# Patient Record
Sex: MALE | Race: WHITE | NOT HISPANIC OR LATINO | Employment: PART TIME | URBAN - METROPOLITAN AREA
[De-identification: names, ages, dates, MRNs, and addresses within clinical notes are randomized per-mention and may not be internally consistent; named-entity substitution may affect disease eponyms.]

---

## 2023-07-09 ENCOUNTER — HOSPITAL ENCOUNTER (EMERGENCY)
Facility: HOSPITAL | Age: 38
Discharge: HOME | End: 2023-07-09
Attending: EMERGENCY MEDICINE
Payer: COMMERCIAL

## 2023-07-09 ENCOUNTER — APPOINTMENT (EMERGENCY)
Dept: RADIOLOGY | Facility: HOSPITAL | Age: 38
End: 2023-07-09
Payer: COMMERCIAL

## 2023-07-09 VITALS
OXYGEN SATURATION: 100 % | TEMPERATURE: 97.6 F | DIASTOLIC BLOOD PRESSURE: 76 MMHG | SYSTOLIC BLOOD PRESSURE: 151 MMHG | BODY MASS INDEX: 38.31 KG/M2 | HEART RATE: 62 BPM | HEIGHT: 72 IN | RESPIRATION RATE: 20 BRPM | WEIGHT: 282.85 LBS

## 2023-07-09 DIAGNOSIS — R09.89 CHOKING EPISODE: Primary | ICD-10-CM

## 2023-07-09 PROCEDURE — 71045 X-RAY EXAM CHEST 1 VIEW: CPT

## 2023-07-09 PROCEDURE — 99283 EMERGENCY DEPT VISIT LOW MDM: CPT

## 2023-07-09 NOTE — ED PROVIDER NOTES
Pt Name: Nithin Cottonport  MRN: [id number]  [address] 1985  Age/Sex: 41 y.o. male  Date of evaluation: 7/9/2023  PCP: Dannie Hurt MD    [address]    Chief Complaint   Patient presents with   • Medical Problem     Pt reports choking on his lasagna dinner around 6143, pt had heimlich performed on him and was able to dislodge food. Pt has no complaints at this time         HPI    41 y.o. male presenting with choking episode. Patient requires care, per caregivers, patient was eating a dinner of stuffed shells very rapidly when he had a choking episode. Caregiver applied the Heimlich maneuver with resolution of the episode. Following that, patient sat down and completed his dinner without incident, was also drinking water on the way in. He denies any symptoms at this time. Caregiver states that they brought him in to be checked out per their employer's protocol. HPI      Past Medical and Surgical History    History reviewed. No pertinent past medical history. History reviewed. No pertinent surgical history. History reviewed. No pertinent family history. Social History     Tobacco Use   • Smoking status: Never   • Smokeless tobacco: Never   Substance Use Topics   • Alcohol use: Never   • Drug use: Never           Allergies    No Known Allergies    Home Medications    Prior to Admission medications    Not on File           Review of Systems    Review of Systems   Constitutional: Negative for appetite change, chills and diaphoresis. HENT: Negative for drooling, facial swelling, trouble swallowing and voice change. Respiratory: Negative for apnea, shortness of breath and wheezing. Cardiovascular: Negative for chest pain and leg swelling. Gastrointestinal: Negative for abdominal distention, abdominal pain, diarrhea, nausea and vomiting. Genitourinary: Negative for dysuria and urgency. Musculoskeletal: Negative for arthralgias, back pain, gait problem and neck pain.    Skin: Negative for color change, rash and wound. Neurological: Negative for seizures, speech difficulty, weakness and headaches. Psychiatric/Behavioral: Negative for agitation, behavioral problems and dysphoric mood. The patient is not nervous/anxious. All other systems reviewed and negative. Physical Exam      ED Triage Vitals [07/09/23 1932]   Temperature Pulse Respirations Blood Pressure SpO2   97.6 °F (36.4 °C) 62 20 151/76 100 %      Temp Source Heart Rate Source Patient Position - Orthostatic VS BP Location FiO2 (%)   Oral Monitor -- -- --      Pain Score       --               Physical Exam  Vitals and nursing note reviewed. Constitutional:       General: He is not in acute distress. Appearance: He is well-developed. He is not ill-appearing, toxic-appearing or diaphoretic. HENT:      Head: Normocephalic and atraumatic. Right Ear: External ear normal.      Left Ear: External ear normal.      Nose: Nose normal. No congestion. Mouth/Throat:      Pharynx: Oropharynx is clear. Eyes:      Conjunctiva/sclera: Conjunctivae normal.      Pupils: Pupils are equal, round, and reactive to light. Neck:      Trachea: No tracheal deviation. Cardiovascular:      Rate and Rhythm: Normal rate and regular rhythm. Pulses: Normal pulses. Heart sounds: Normal heart sounds. No murmur heard. Pulmonary:      Effort: Pulmonary effort is normal. No respiratory distress. Breath sounds: Normal breath sounds. No stridor. No wheezing or rales. Abdominal:      General: There is no distension. Palpations: Abdomen is soft. Tenderness: There is no abdominal tenderness. There is no guarding or rebound. Musculoskeletal:         General: No deformity. Normal range of motion. Cervical back: Normal range of motion and neck supple. Skin:     General: Skin is warm and dry. Capillary Refill: Capillary refill takes less than 2 seconds. Findings: No rash.    Neurological: Mental Status: He is alert and oriented to person, place, and time. Psychiatric:         Behavior: Behavior normal.         Thought Content: Thought content normal.         Judgment: Judgment normal.              Diagnostic Results      Labs:    Results Reviewed     None          All labs reviewed and utilized in the medical decision making process    Radiology:    XR chest 1 view portable   ED Interpretation   No acute cardiopulmonary process or osseous abnormality. All radiology studies independently viewed by me and interpreted by the radiologist.    Procedure    Procedures        ED Course of Care and Re-Assessments      Patient felt stable for discharge back home. Medications - No data to display        FINAL IMPRESSION    Final diagnoses:   Choking episode         DISPOSITION/PLAN    Presentation as above with choking episode resolved prior to arrival.  Patient able to tolerate remainder of his meal afterwards, able to drink fluids on the way in as well as ER. Examination reassuring with no abnormalities, patient has no complaints at this time. Chest x-ray obtained to help screen for possible aspiration, returned reassuring without evidence of same. Discharged with strict return precautions, follow-up with primary care doctor as needed. Time reflects when diagnosis was documented in both MDM as applicable and the Disposition within this note     Time User Action Codes Description Comment    7/9/2023  8:07 PM Jennifer Farah Add [R09.89] Choking episode       ED Disposition     ED Disposition   Discharge    Condition   Stable    Date/Time   Sun Jul 9, 2023  8:07 PM    Comment   Nils Jacques discharge to home/self care.                Follow-up Information     Follow up With Specialties Details Why Contact Info Additional [facility] Emergency Department Emergency Medicine Go to  If symptoms worsen [address] [facility] [address] [facility] Emergency Department, [place], [place], [address], MD Family Medicine Call  As needed [place]. [address]  [address]  [phone]               PATIENT REFERRED TO:    [address] [place] [address] Emergency Department  [address] [facility] [address] [phone]  Go to   If symptoms worsen    MD Matt Beal. [address]  [place] [id number] [phone]    Call   As needed      DISCHARGE MEDICATIONS:    There are no discharge medications for this patient. No discharge procedures on file. Josseline Og MD    Portions of the record may have been created with voice recognition software. Occasional wrong word or "sound alike" substitutions may have occurred due to the inherent limitations of voice recognition software.   Please read the chart carefully and recognize, using context, where substitutions have occurred     Josseline Og MD  07/09/23 3441

## 2023-07-14 ENCOUNTER — APPOINTMENT (EMERGENCY)
Dept: CT IMAGING | Facility: HOSPITAL | Age: 38
End: 2023-07-14
Payer: COMMERCIAL

## 2023-07-14 ENCOUNTER — HOSPITAL ENCOUNTER (EMERGENCY)
Facility: HOSPITAL | Age: 38
Discharge: HOME | End: 2023-07-14
Attending: EMERGENCY MEDICINE
Payer: COMMERCIAL

## 2023-07-14 VITALS
DIASTOLIC BLOOD PRESSURE: 78 MMHG | OXYGEN SATURATION: 98 % | TEMPERATURE: 97.8 F | SYSTOLIC BLOOD PRESSURE: 122 MMHG | RESPIRATION RATE: 18 BRPM | HEART RATE: 72 BPM

## 2023-07-14 DIAGNOSIS — J02.9 SORE THROAT: Primary | ICD-10-CM

## 2023-07-14 LAB
ALBUMIN SERPL BCP-MCNC: 5 G/DL (ref 3.5–5)
ALP SERPL-CCNC: 57 U/L (ref 34–104)
ALT SERPL W P-5'-P-CCNC: 21 U/L (ref 7–52)
ANION GAP SERPL CALCULATED.3IONS-SCNC: 10 MMOL/L
AST SERPL W P-5'-P-CCNC: 11 U/L (ref 13–39)
BASOPHILS # BLD AUTO: 0.05 THOUSANDS/ÂΜL (ref 0–0.1)
BASOPHILS NFR BLD AUTO: 0 % (ref 0–1)
BILIRUB SERPL-MCNC: 1.11 MG/DL (ref 0.2–1)
BUN SERPL-MCNC: 15 MG/DL (ref 5–25)
CALCIUM SERPL-MCNC: 10.1 MG/DL (ref 8.4–10.2)
CHLORIDE SERPL-SCNC: 103 MMOL/L (ref 96–108)
CO2 SERPL-SCNC: 24 MMOL/L (ref 21–32)
CREAT SERPL-MCNC: 0.82 MG/DL (ref 0.6–1.3)
EOSINOPHIL # BLD AUTO: 0.03 THOUSAND/ÂΜL (ref 0–0.61)
EOSINOPHIL NFR BLD AUTO: 0 % (ref 0–6)
ERYTHROCYTE [DISTWIDTH] IN BLOOD BY AUTOMATED COUNT: 13.7 % (ref 11.6–15.1)
FLUAV RNA RESP QL NAA+PROBE: NEGATIVE
FLUBV RNA RESP QL NAA+PROBE: NEGATIVE
GFR SERPL CREATININE-BSD FRML MDRD: 112 ML/MIN/1.73SQ M
GLUCOSE SERPL-MCNC: 86 MG/DL (ref 65–140)
HCT VFR BLD AUTO: 44.7 % (ref 36.5–49.3)
HGB BLD-MCNC: 15.4 G/DL (ref 12–17)
IMM GRANULOCYTES # BLD AUTO: 0.03 THOUSAND/UL (ref 0–0.2)
IMM GRANULOCYTES NFR BLD AUTO: 0 % (ref 0–2)
LYMPHOCYTES # BLD AUTO: 2.16 THOUSANDS/ÂΜL (ref 0.6–4.47)
LYMPHOCYTES NFR BLD AUTO: 18 % (ref 14–44)
MCH RBC QN AUTO: 28.7 PG (ref 26.8–34.3)
MCHC RBC AUTO-ENTMCNC: 34.5 G/DL (ref 31.4–37.4)
MCV RBC AUTO: 83 FL (ref 82–98)
MONOCYTES # BLD AUTO: 0.56 THOUSAND/ÂΜL (ref 0.17–1.22)
MONOCYTES NFR BLD AUTO: 5 % (ref 4–12)
NEUTROPHILS # BLD AUTO: 9.22 THOUSANDS/ÂΜL (ref 1.85–7.62)
NEUTS SEG NFR BLD AUTO: 77 % (ref 43–75)
NRBC BLD AUTO-RTO: 0 /100 WBCS
PLATELET # BLD AUTO: 295 THOUSANDS/UL (ref 149–390)
PMV BLD AUTO: 9.4 FL (ref 8.9–12.7)
POTASSIUM SERPL-SCNC: 3.7 MMOL/L (ref 3.5–5.3)
PROT SERPL-MCNC: 8.1 G/DL (ref 6.4–8.4)
RBC # BLD AUTO: 5.37 MILLION/UL (ref 3.88–5.62)
RSV RNA RESP QL NAA+PROBE: NEGATIVE
S PYO DNA THROAT QL NAA+PROBE: NOT DETECTED
SARS-COV-2 RNA RESP QL NAA+PROBE: NEGATIVE
SODIUM SERPL-SCNC: 137 MMOL/L (ref 135–147)
WBC # BLD AUTO: 12.05 THOUSAND/UL (ref 4.31–10.16)

## 2023-07-14 PROCEDURE — 36415 COLL VENOUS BLD VENIPUNCTURE: CPT | Performed by: EMERGENCY MEDICINE

## 2023-07-14 PROCEDURE — 96361 HYDRATE IV INFUSION ADD-ON: CPT

## 2023-07-14 PROCEDURE — 0241U HB NFCT DS VIR RESP RNA 4 TRGT: CPT | Performed by: EMERGENCY MEDICINE

## 2023-07-14 PROCEDURE — 80053 COMPREHEN METABOLIC PANEL: CPT | Performed by: EMERGENCY MEDICINE

## 2023-07-14 PROCEDURE — 87651 STREP A DNA AMP PROBE: CPT | Performed by: EMERGENCY MEDICINE

## 2023-07-14 PROCEDURE — 85025 COMPLETE CBC W/AUTO DIFF WBC: CPT | Performed by: EMERGENCY MEDICINE

## 2023-07-14 PROCEDURE — G1004 CDSM NDSC: HCPCS

## 2023-07-14 PROCEDURE — 99284 EMERGENCY DEPT VISIT MOD MDM: CPT | Performed by: EMERGENCY MEDICINE

## 2023-07-14 PROCEDURE — 96374 THER/PROPH/DIAG INJ IV PUSH: CPT

## 2023-07-14 PROCEDURE — 70491 CT SOFT TISSUE NECK W/DYE: CPT

## 2023-07-14 PROCEDURE — 99283 EMERGENCY DEPT VISIT LOW MDM: CPT

## 2023-07-14 RX ORDER — KETOROLAC TROMETHAMINE 30 MG/ML
15 INJECTION, SOLUTION INTRAMUSCULAR; INTRAVENOUS ONCE
Status: COMPLETED | OUTPATIENT
Start: 2023-07-14 | End: 2023-07-14

## 2023-07-14 RX ADMIN — IOHEXOL 85 ML: 350 INJECTION, SOLUTION INTRAVENOUS at 21:24

## 2023-07-14 RX ADMIN — SODIUM CHLORIDE 1000 ML: 0.9 INJECTION, SOLUTION INTRAVENOUS at 20:25

## 2023-07-14 RX ADMIN — KETOROLAC TROMETHAMINE 15 MG: 30 INJECTION, SOLUTION INTRAMUSCULAR at 20:23

## 2023-07-14 RX ADMIN — DEXAMETHASONE SODIUM PHOSPHATE 10 MG: 10 INJECTION, SOLUTION INTRAMUSCULAR; INTRAVENOUS at 20:13

## 2023-07-15 NOTE — ED PROVIDER NOTES
History  Chief Complaint   Patient presents with   • Sore Throat     Caretaker states pt has been c/o sore throat, chills and body aches. 45-year-old male, history of autism, presents to the emergency room with his caregiver for sore throat, chills, subjective fever, and difficulty swallowing. States that he was seen here 6 days ago after he choked on a stuffed shell. States that he was discharged and saw his PCP Wednesday - had labs and ct scan done which were normal. States that today he developed chills, and subjective fever. States that he ate a few bites of mac and cheese but stopped bc his throat hurt. No other complaints. No sick contacts       History provided by:  Patient  Sore Throat  Location:  Generalized  Severity:  Moderate  Onset quality:  Sudden  Timing:  Constant  Progression:  Worsening  Chronicity:  New  Relieved by:  None tried  Worsened by:  Nothing  Ineffective treatments:  None tried  Associated symptoms: no abdominal pain, no chest pain, no chills, no cough, no ear pain, no fever, no headaches, no neck stiffness, no rash and no shortness of breath    Risk factors: no sick contacts        None       History reviewed. No pertinent past medical history. History reviewed. No pertinent surgical history. History reviewed. No pertinent family history. I have reviewed and agree with the history as documented. E-Cigarette/Vaping     E-Cigarette/Vaping Substances     Social History     Tobacco Use   • Smoking status: Never   • Smokeless tobacco: Never   Substance Use Topics   • Alcohol use: Never   • Drug use: Never       Review of Systems   Constitutional: Negative for chills and fever. HENT: Positive for sore throat. Negative for congestion and ear pain. Eyes: Negative for pain and visual disturbance. Respiratory: Negative for cough, shortness of breath and wheezing. Cardiovascular: Negative for chest pain and leg swelling.    Gastrointestinal: Negative for abdominal pain, diarrhea, nausea and vomiting. Genitourinary: Negative for dysuria, frequency, hematuria and urgency. Musculoskeletal: Negative for neck pain and neck stiffness. Skin: Negative for rash and wound. Neurological: Negative for weakness, numbness and headaches. Psychiatric/Behavioral: Negative for agitation and confusion. All other systems reviewed and are negative. Physical Exam  Physical Exam  Vitals and nursing note reviewed. Constitutional:       Appearance: He is well-developed. HENT:      Head: Normocephalic and atraumatic. Eyes:      Pupils: Pupils are equal, round, and reactive to light. Cardiovascular:      Rate and Rhythm: Normal rate and regular rhythm. Pulmonary:      Effort: Pulmonary effort is normal.      Breath sounds: Normal breath sounds. Abdominal:      General: Bowel sounds are normal.      Palpations: Abdomen is soft. Musculoskeletal:         General: Normal range of motion. Cervical back: Normal range of motion and neck supple. Skin:     General: Skin is warm and dry. Neurological:      General: No focal deficit present. Mental Status: He is alert and oriented to person, place, and time.       Comments: No focal deficits         Vital Signs  ED Triage Vitals   Temperature Pulse Respirations Blood Pressure SpO2   07/14/23 2013 07/14/23 2011 07/14/23 2011 07/14/23 2011 07/14/23 2011   97.8 °F (36.6 °C) 73 18 154/84 100 %      Temp Source Heart Rate Source Patient Position - Orthostatic VS BP Location FiO2 (%)   07/14/23 2013 07/14/23 2200 -- -- --   Oral Monitor         Pain Score       --                  Vitals:    07/14/23 2030 07/14/23 2130 07/14/23 2200 07/14/23 2300   BP: 146/87 145/76 134/61 122/78   Pulse: 69 87 73 72         Visual Acuity      ED Medications  Medications   sodium chloride 0.9 % bolus 1,000 mL (0 mL Intravenous Stopped 7/14/23 2300)   dexamethasone oral liquid 10 mg 1 mL (10 mg Oral Given 7/14/23 2013)   ketorolac (TORADOL) injection 15 mg (15 mg Intravenous Given 7/14/23 2023)   iohexol (OMNIPAQUE) 350 MG/ML injection (SINGLE-DOSE) 85 mL (85 mL Intravenous Given 7/14/23 2124)       Diagnostic Studies  Results Reviewed     Procedure Component Value Units Date/Time    FLU/RSV/COVID - if FLU/RSV clinically relevant [[id number]]  (Normal) Collected: 07/14/23 2014    Lab Status: Final result Specimen: Nares from Nose Updated: 07/14/23 2130     SARS-CoV-2 Negative     INFLUENZA A PCR Negative     INFLUENZA B PCR Negative     RSV PCR Negative    Narrative:      FOR PEDIATRIC PATIENTS - copy/paste COVID Guidelines URL to browser: [email or web address]    SARS-CoV-2 assay is a Nucleic Acid Amplification assay intended for the  qualitative detection of nucleic acid from SARS-CoV-2 in nasopharyngeal  swabs. Results are for the presumptive identification of SARS-CoV-2 RNA. Positive results are indicative of infection with SARS-CoV-2, the virus  causing COVID-19, but do not rule out bacterial infection or co-infection  with other viruses. Laboratories within the [facility] and its  territories are required to report all positive results to the appropriate  public health authorities. Negative results do not preclude SARS-CoV-2  infection and should not be used as the sole basis for treatment or other  patient management decisions. Negative results must be combined with  clinical observations, patient history, and epidemiological information. This test has not been FDA cleared or approved. This test has been authorized by FDA under an Emergency Use Authorization  (EUA). This test is only authorized for the duration of time the  declaration that circumstances exist justifying the authorization of the  emergency use of an in vitro diagnostic tests for detection of SARS-CoV-2  virus and/or diagnosis of COVID-19 infection under section 564(b)(1) of  the Act, 21 U. S.C. 771HCZ-9(P)(2), unless the authorization is terminated  or revoked sooner. The test has been validated but independent review by FDA  and CLIA is pending. Test performed using [employer] Gene[employer]pert: This RT-PCR assay targets N2,  a region unique to SARS-CoV-2. A conserved region in the E-gene was chosen  for pan-Sarbecovirus detection which includes SARS-CoV-2. According to CMS-2020-01-R, this platform meets the definition of high-throughput technology.     Strep A PCR [[id number]]  (Normal) Collected: 07/14/23 2016    Lab Status: Final result Specimen: Throat Updated: 07/14/23 2118     STREP A PCR Not Detected    Comprehensive metabolic panel [[id number]]  (Abnormal) Collected: 07/14/23 2023    Lab Status: Final result Specimen: Blood from Arm, Right Updated: 07/14/23 2111     Sodium 137 mmol/L      Potassium 3.7 mmol/L      Chloride 103 mmol/L      CO2 24 mmol/L      ANION GAP 10 mmol/L      BUN 15 mg/dL      Creatinine 0.82 mg/dL      Glucose 86 mg/dL      Calcium 10.1 mg/dL      AST 11 U/L      ALT 21 U/L      Alkaline Phosphatase 57 U/L      Total Protein 8.1 g/dL      Albumin 5.0 g/dL      Total Bilirubin 1.11 mg/dL      eGFR 112 ml/min/1.73sq m     Narrative:      [place] guidelines for Chronic Kidney Disease (CKD):   •  Stage 1 with normal or high GFR (GFR > 90 mL/min/1.73 square meters)  •  Stage 2 Mild CKD (GFR = 60-89 mL/min/1.73 square meters)  •  Stage 3A Moderate CKD (GFR = 45-59 mL/min/1.73 square meters)  •  Stage 3B Moderate CKD (GFR = 30-44 mL/min/1.73 square meters)  •  Stage 4 Severe CKD (GFR = 15-29 mL/min/1.73 square meters)  •  Stage 5 End Stage CKD (GFR <15 mL/min/1.73 square meters)  Note: GFR calculation is accurate only with a steady state creatinine    CBC and differential [[id number]]  (Abnormal) Collected: 07/14/23 2023    Lab Status: Final result Specimen: Blood from Arm, Right Updated: 07/14/23 2051     WBC 12.05 Thousand/uL      RBC 5.37 Million/uL      Hemoglobin 15.4 g/dL Hematocrit 44.7 %      MCV 83 fL      MCH 28.7 pg      MCHC 34.5 g/dL      RDW 13.7 %      MPV 9.4 fL      Platelets 457 Thousands/uL      nRBC 0 /100 WBCs      Neutrophils Relative 77 %      Immat GRANS % 0 %      Lymphocytes Relative 18 %      Monocytes Relative 5 %      Eosinophils Relative 0 %      Basophils Relative 0 %      Neutrophils Absolute 9.22 Thousands/µL      Immature Grans Absolute 0.03 Thousand/uL      Lymphocytes Absolute 2.16 Thousands/µL      Monocytes Absolute 0.56 Thousand/µL      Eosinophils Absolute 0.03 Thousand/µL      Basophils Absolute 0.05 Thousands/µL     UA w Reflex to Microscopic w Reflex to Culture [[id number]]     Lab Status: No result Specimen: Urine                  CT soft tissue neck with contrast   Final Result by Ramesh Brown MD (07/14 2222)      No acute CT findings. Incidental thyroid nodule(s) for which nonemergent thyroid ultrasound is recommended. Workstation performed: [id number]                    Procedures  Procedures         ED Course  ED Course as of 07/15/23 0054   Fri Jul 14, 2023 1958 Choked on Sunday - stuffed shell - seen here. Discharged and saw his pcp Wednesday. Had labs and ct scan done which were normal. Having chills, felt warm, feels like throat is closing. Lives in a home with a caregiver. No one else is sick. Medical Decision Making  42 y/o male presents with sore throat, chills, and subjective fever- will get labs, CT scan, strep, and covid/flu- will reassess. Sore throat: acute illness or injury  Amount and/or Complexity of Data Reviewed  Labs: ordered. Radiology: ordered. Risk  Prescription drug management.           Disposition  Final diagnoses:   Sore throat     Time reflects when diagnosis was documented in both MDM as applicable and the Disposition within this note     Time User Action Codes Description Comment    7/14/2023 11:00 PM Peggy ORTIZ Add [J02.9] Sore throat       ED Disposition     ED Disposition   Discharge    Condition   Stable    Date/Time   Fri Jul 14, 2023 11:00 PM    Comment   Casey Angeles discharge to home/self care. Follow-up Information     Follow up With Specialties Details Why Contact Info Additional [address] [facility] G[facility]ology Call in 1 day for follow up within 1 week [address] [facility]  [address] [facility], [address] [facility], [address] [facility], [place], [place], [address] [facility] Emergency Department Emergency Medicine Go to  immediately for any new or worsening symptoms [address] [facility] [address] [facility] [address]  [address] [place] [address] Emergency Department, [place], [place], [address]          There are no discharge medications for this patient. No discharge procedures on file.     PDMP Review     None          ED Provider  Electronically Signed by           Chuy Morgan DO  07/15/23 8750

## 2023-07-28 ENCOUNTER — OFFICE VISIT (OUTPATIENT)
Age: 38
End: 2023-07-28
Payer: COMMERCIAL

## 2023-07-28 VITALS
BODY MASS INDEX: 36.57 KG/M2 | DIASTOLIC BLOOD PRESSURE: 70 MMHG | OXYGEN SATURATION: 97 % | HEIGHT: 72 IN | HEART RATE: 76 BPM | WEIGHT: 270 LBS | SYSTOLIC BLOOD PRESSURE: 122 MMHG

## 2023-07-28 DIAGNOSIS — R13.10 DYSPHAGIA, UNSPECIFIED TYPE: ICD-10-CM

## 2023-07-28 DIAGNOSIS — K21.9 GASTROESOPHAGEAL REFLUX DISEASE, UNSPECIFIED WHETHER ESOPHAGITIS PRESENT: Primary | ICD-10-CM

## 2023-07-28 DIAGNOSIS — R63.4 WEIGHT LOSS: ICD-10-CM

## 2023-07-28 PROCEDURE — 99204 OFFICE O/P NEW MOD 45 MIN: CPT | Performed by: PHYSICIAN ASSISTANT

## 2023-07-28 RX ORDER — ATORVASTATIN CALCIUM 40 MG/1
40 TABLET, FILM COATED ORAL EVERY EVENING
COMMUNITY
Start: 2023-07-26

## 2023-07-28 RX ORDER — LISINOPRIL 5 MG/1
5 TABLET ORAL DAILY
COMMUNITY
Start: 2023-07-05

## 2023-07-28 RX ORDER — MELATONIN
1000 DAILY
COMMUNITY

## 2023-07-28 RX ORDER — PANTOPRAZOLE SODIUM 20 MG/1
20 TABLET, DELAYED RELEASE ORAL
COMMUNITY
Start: 2023-07-12

## 2023-07-28 RX ORDER — PANTOPRAZOLE SODIUM 40 MG/1
40 TABLET, DELAYED RELEASE ORAL DAILY
Qty: 30 TABLET | Refills: 1 | Status: SHIPPED | OUTPATIENT
Start: 2023-07-28 | End: 2023-08-27

## 2023-07-28 NOTE — PROGRESS NOTES
[facility] - Outpatient Consultation  Nikos Nguyen 41 y.o. male MRN: [id number]  Encounter: [id number]          ASSESSMENT AND PLAN:      1. Dysphagia  2. Gastroesophageal reflux disease  3. Weight loss    Patient presents with complaints of dysphagia, heartburn, and excessive belching. He also has had a substantial weight loss. He was started on Pantoprazole 20mg po daily with minimal benefit. Will plan for EGD to investigate for strictures/rings, esophagitis, ulcers, malignancy, etc.  Will increase Pantoprazole to 40mg po daily. ______________________________________________________________________    HPI:  Patient is a pleasant 41year old male who presents with his caretaker for an evaluation of dysphagia. Patient has been having episodes of dysphagia. He reports episodes of dysphagia with solids and liquids. He also has been having heartburn and excessive belching. His caretaker also reports he had weight loss as well. No prior EGD. No melena or rectal bleeding. He was started on Pantoprazole 20mg po daily with minimal benefit. REVIEW OF SYSTEMS:    CONSTITUTIONAL: Denies any fever, chills, rigors; +weight loss. HEENT: No earache or tinnitus. Denies hearing loss or visual disturbances. CARDIOVASCULAR: No chest pain or palpitations. RESPIRATORY: Denies any cough, hemoptysis, shortness of breath or dyspnea on exertion. GASTROINTESTINAL: As noted in the History of Present Illness. GENITOURINARY: No problems with urination. Denies any hematuria or dysuria. NEUROLOGIC: No dizziness or vertigo, denies headaches. MUSCULOSKELETAL: Denies any muscle or joint pain. SKIN: Denies skin rashes or itching. ENDOCRINE: Denies excessive thirst. Denies intolerance to heat or cold. PSYCHOSOCIAL: Denies depression or anxiety. Denies any recent memory loss.        Historical Information   Past Medical History:   Diagnosis Date   • GERD (gastroesophageal reflux disease)      History reviewed. No pertinent surgical history. Social History   Social History     Substance and Sexual Activity   Alcohol Use Never     Social History     Substance and Sexual Activity   Drug Use Never     Social History     Tobacco Use   Smoking Status Never   Smokeless Tobacco Never     History reviewed. No pertinent family history. Meds/Allergies       Current Outpatient Medications:   •  atorvastatin (LIPITOR) 40 mg tablet  •  cholecalciferol (VITAMIN D3) 1,000 units tablet  •  lisinopril (ZESTRIL) 5 mg tablet  •  pantoprazole (PROTONIX) 20 mg tablet  •  pantoprazole (PROTONIX) 40 mg tablet    No Known Allergies        Objective     Blood pressure 122/70, pulse 76, height 6' (1.829 m), weight 122 kg (270 lb), SpO2 97 %. Body mass index is 36.62 kg/m². PHYSICAL EXAM:      General Appearance:   Alert, cooperative, no distress   HEENT:   Normocephalic, atraumatic, anicteric    Neck:  Supple, symmetrical, trachea midline   Lungs:   Clear to auscultation bilaterally; no rales, rhonchi or wheezing; respirations unlabored    Heart[de-identified]   Regular rate and rhythm; no murmur, rub, or gallop. Abdomen:   Soft, non-tender, non-distended; normal bowel sounds; no masses, no organomegaly    Genitalia:   Deferred    Rectal:   Deferred    Extremities:  No cyanosis, clubbing   Pulses:  2+ and symmetric    Skin:  No jaundice, rashes, or lesions    Lymph nodes:  No palpable cervical lymphadenopathy        Lab Results:   No visits with results within 1 Day(s) from this visit.    Latest known visit with results is:   Admission on 07/14/2023, Discharged on 07/14/2023   Component Date Value   • WBC 07/14/2023 12.05 (H)    • RBC 07/14/2023 5.37    • Hemoglobin 07/14/2023 15.4    • Hematocrit 07/14/2023 44.7    • MCV 07/14/2023 83    • MCH 07/14/2023 28.7    • MCHC 07/14/2023 34.5    • RDW 07/14/2023 13.7    • MPV 07/14/2023 9.4    • Platelets 12/40/5614 295    • nRBC 07/14/2023 0    • Neutrophils Relative 07/14/2023 77 (H)    • Immat GRANS % 07/14/2023 0    • Lymphocytes Relative 07/14/2023 18    • Monocytes Relative 07/14/2023 5    • Eosinophils Relative 07/14/2023 0    • Basophils Relative 07/14/2023 0    • Neutrophils Absolute 07/14/2023 9.22 (H)    • Immature Grans Absolute 07/14/2023 0.03    • Lymphocytes Absolute 07/14/2023 2.16    • Monocytes Absolute 07/14/2023 0.56    • Eosinophils Absolute 07/14/2023 0.03    • Basophils Absolute 07/14/2023 0.05    • Sodium 07/14/2023 137    • Potassium 07/14/2023 3.7    • Chloride 07/14/2023 103    • CO2 07/14/2023 24    • ANION GAP 07/14/2023 10    • BUN 07/14/2023 15    • Creatinine 07/14/2023 0.82    • Glucose 07/14/2023 86    • Calcium 07/14/2023 10.1    • AST 07/14/2023 11 (L)    • ALT 07/14/2023 21    • Alkaline Phosphatase 07/14/2023 57    • Total Protein 07/14/2023 8.1    • Albumin 07/14/2023 5.0    • Total Bilirubin 07/14/2023 1.11 (H)    • eGFR 07/14/2023 112    • SARS-CoV-2 07/14/2023 Negative    • INFLUENZA A PCR 07/14/2023 Negative    • INFLUENZA B PCR 07/14/2023 Negative    • RSV PCR 07/14/2023 Negative    • STREP A PCR 07/14/2023 Not Detected          Radiology Results:   CT soft tissue neck with contrast    Result Date: 7/14/2023  Narrative: CT NECK WITH CONTRAST INDICATION:   difficulty swallowing. COMPARISON:  None. TECHNIQUE:  Axial, sagittal, and coronal 2D reformatted images were created from the axial source data and submitted for interpretation. Radiation dose length product (DLP) for this visit:  768 mGy-cm . This examination, like all CT scans performed in the [facility], was performed utilizing techniques to minimize radiation dose exposure, including the use of iterative reconstruction and automated exposure control. IV Contrast:  85 mL of iohexol (OMNIPAQUE) IMAGE QUALITY:  Diagnostic. FINDINGS: VISUALIZED BRAIN PARENCHYMA:  No acute intracranial pathology of the visualized brain parenchyma.  VISUALIZED ORBITS: Normal visualized orbits. PARANASAL SINUSES: Normal visualized paranasal sinuses. NASAL CAVITY AND NASOPHARYNX:  Normal. SUPRAHYOID NECK:  Normal oral cavity, tongue base, tonsillar fossa and epiglottis. INFRAHYOID NECK:  Aryepiglottic folds and piriform sinuses are normal.  Normal glottis and subglottic airway. THYROID GLAND: 1.7 cm right thyroid nodule  Incidental discovery of one or more thyroid nodule(s) measuring more than 1.5 cm and without suspicious features is noted in this patient who is above 28years old; according to guidelines published in the February 2015 white paper on incidental thyroid nodules in the Journal of the [employer] of Radiology Gerhardt Bean), further characterization with thyroid ultrasound is recommended. PAROTID AND SUBMANDIBULAR GLANDS:  Normal. LYMPH NODES:  No pathologic or enlarged adenopathy. VASCULAR STRUCTURES:  Normal enhancement of the cervical vasculature. THORACIC INLET:  Lung apices and upper mediastinum are unremarkable. BONY STRUCTURES: No acute fracture or destructive osseous lesion. Impression: No acute CT findings. Incidental thyroid nodule(s) for which nonemergent thyroid ultrasound is recommended. Workstation performed: [id number]     XR chest 1 view portable    Result Date: 7/10/2023  Narrative: CHEST INDICATION:   possible aspiration. COMPARISON:  None EXAM PERFORMED/VIEWS:  XR CHEST PORTABLE FINDINGS: Cardiomediastinal silhouette appears unremarkable. There is mild bibasilar atelectasis with no focal infiltrate or consolidation. No pneumothorax or pleural effusion. Osseous structures appear within normal limits for patient age. Impression: No acute cardiopulmonary disease.  Workstation performed: [id number]

## 2023-07-28 NOTE — PATIENT INSTRUCTIONS
Scheduled date of EGD(as of today): 9/1/23  Physician performing EGD: Ras  Location of EGD: [facility]  Instructions reviewed with patient by: Sophia SAMANIEGO  Clearances:

## 2023-08-26 DIAGNOSIS — K21.9 GASTROESOPHAGEAL REFLUX DISEASE, UNSPECIFIED WHETHER ESOPHAGITIS PRESENT: ICD-10-CM

## 2023-08-28 RX ORDER — PANTOPRAZOLE SODIUM 40 MG/1
40 TABLET, DELAYED RELEASE ORAL DAILY
Qty: 90 TABLET | Refills: 1 | Status: SHIPPED | OUTPATIENT
Start: 2023-08-28

## 2023-08-31 ENCOUNTER — TELEPHONE (OUTPATIENT)
Age: 38
End: 2023-08-31

## 2023-09-22 ENCOUNTER — ANESTHESIA (OUTPATIENT)
Dept: GASTROENTEROLOGY | Facility: HOSPITAL | Age: 38
End: 2023-09-22

## 2023-09-22 ENCOUNTER — HOSPITAL ENCOUNTER (OUTPATIENT)
Dept: GASTROENTEROLOGY | Facility: HOSPITAL | Age: 38
Setting detail: OUTPATIENT SURGERY
End: 2023-09-22
Payer: COMMERCIAL

## 2023-09-22 ENCOUNTER — ANESTHESIA EVENT (OUTPATIENT)
Dept: GASTROENTEROLOGY | Facility: HOSPITAL | Age: 38
End: 2023-09-22

## 2023-09-22 VITALS
OXYGEN SATURATION: 98 % | HEART RATE: 72 BPM | TEMPERATURE: 97.5 F | SYSTOLIC BLOOD PRESSURE: 127 MMHG | WEIGHT: 264.11 LBS | HEIGHT: 72 IN | RESPIRATION RATE: 16 BRPM | BODY MASS INDEX: 35.77 KG/M2 | DIASTOLIC BLOOD PRESSURE: 85 MMHG

## 2023-09-22 DIAGNOSIS — R13.10 DYSPHAGIA, UNSPECIFIED TYPE: ICD-10-CM

## 2023-09-22 DIAGNOSIS — R63.4 WEIGHT LOSS: ICD-10-CM

## 2023-09-22 DIAGNOSIS — K21.9 GASTROESOPHAGEAL REFLUX DISEASE, UNSPECIFIED WHETHER ESOPHAGITIS PRESENT: ICD-10-CM

## 2023-09-22 PROCEDURE — 88305 TISSUE EXAM BY PATHOLOGIST: CPT | Performed by: PATHOLOGY

## 2023-09-22 PROCEDURE — C1769 GUIDE WIRE: HCPCS

## 2023-09-22 RX ORDER — SODIUM CHLORIDE, SODIUM LACTATE, POTASSIUM CHLORIDE, CALCIUM CHLORIDE 600; 310; 30; 20 MG/100ML; MG/100ML; MG/100ML; MG/100ML
INJECTION, SOLUTION INTRAVENOUS CONTINUOUS PRN
Status: DISCONTINUED | OUTPATIENT
Start: 2023-09-22 | End: 2023-09-22

## 2023-09-22 RX ORDER — PROPOFOL 10 MG/ML
INJECTION, EMULSION INTRAVENOUS AS NEEDED
Status: DISCONTINUED | OUTPATIENT
Start: 2023-09-22 | End: 2023-09-22

## 2023-09-22 RX ORDER — ONDANSETRON 2 MG/ML
4 INJECTION INTRAMUSCULAR; INTRAVENOUS ONCE AS NEEDED
Status: CANCELLED | OUTPATIENT
Start: 2023-09-22

## 2023-09-22 RX ADMIN — SODIUM CHLORIDE, SODIUM LACTATE, POTASSIUM CHLORIDE, AND CALCIUM CHLORIDE: .6; .31; .03; .02 INJECTION, SOLUTION INTRAVENOUS at 09:35

## 2023-09-22 RX ADMIN — PROPOFOL 50 MG: 10 INJECTION, EMULSION INTRAVENOUS at 09:40

## 2023-09-22 RX ADMIN — PROPOFOL 100 MG: 10 INJECTION, EMULSION INTRAVENOUS at 09:38

## 2023-09-22 RX ADMIN — PROPOFOL 50 MG: 10 INJECTION, EMULSION INTRAVENOUS at 09:43

## 2023-09-22 NOTE — H&P
.  History and Physical - [facility]  Boni Celeste 41 y.o. male MRN: [id number]                  HPI: Boni Celeste is a 41y.o. year old male who presents for EGD for dysphagia. REVIEW OF SYSTEMS: Per the HPI, and otherwise unremarkable. Historical Information   Past Medical History:   Diagnosis Date   • GERD (gastroesophageal reflux disease)      History reviewed. No pertinent surgical history. Social History   Social History     Substance and Sexual Activity   Alcohol Use Never     Social History     Substance and Sexual Activity   Drug Use Never     Social History     Tobacco Use   Smoking Status Never   Smokeless Tobacco Never     History reviewed. No pertinent family history. Meds/Allergies       Current Outpatient Medications:   •  atorvastatin (LIPITOR) 40 mg tablet  •  cholecalciferol (VITAMIN D3) 1,000 units tablet  •  lisinopril (ZESTRIL) 5 mg tablet  •  pantoprazole (PROTONIX) 20 mg tablet  •  pantoprazole (PROTONIX) 40 mg tablet    No Known Allergies    Objective     /67   Pulse 63   Temp 97.6 °F (36.4 °C) (Temporal)   Resp 18   Ht 6' (1.829 m)   Wt 120 kg (264 lb 1.8 oz)   SpO2 100%   BMI 35.82 kg/m²       PHYSICAL EXAM    Gen: NAD  Head: NCAT  CV: RRR  CHEST: Clear  ABD: soft, NT/ND  EXT: no edema      ASSESSMENT/PLAN:  Boni Celeste is a 41y.o. year old male who presents for EGD for dysphagia. The patient is stable and optimized for the procedure, we reviewed risk and benefits. Risk include but not limited to infection, bleeding, perforation and missing a lesion.

## 2023-09-22 NOTE — ANESTHESIA PREPROCEDURE EVALUATION
Procedure:  EGD    Relevant Problems   No relevant active problems      GERD  BMI35    Physical Exam    Airway    Mallampati score: III  TM Distance: <3 FB  Neck ROM: full     Dental   No notable dental hx     Cardiovascular      Pulmonary      Other Findings        Anesthesia Plan  ASA Score- 2     Anesthesia Type- IV sedation with anesthesia with ASA Monitors. Additional Monitors:   Airway Plan:           Plan Factors-Exercise tolerance (METS): >4 METS. Chart reviewed. Patient summary reviewed. Induction- intravenous. Postoperative Plan-     Informed Consent- Anesthetic plan and risks discussed with patient. I personally reviewed this patient with the CRNA. Discussed and agreed on the Anesthesia Plan with the CRNA. Beck Diego

## 2023-09-22 NOTE — ANESTHESIA POSTPROCEDURE EVALUATION
Post-Op Assessment Note    CV Status:  Stable    Pain management: adequate     Mental Status:  Alert and awake   Hydration Status:  Euvolemic   PONV Controlled:  Controlled   Airway Patency:  Patent      Post Op Vitals Reviewed: Yes      Staff: CRNA         No notable events documented.     BP   122/72   Temp   36   Pulse  74   Resp   14   SpO2   100

## 2023-09-28 PROCEDURE — 88305 TISSUE EXAM BY PATHOLOGIST: CPT | Performed by: PATHOLOGY

## 2023-09-29 ENCOUNTER — TELEPHONE (OUTPATIENT)
Age: 38
End: 2023-09-29

## 2023-09-29 NOTE — TELEPHONE ENCOUNTER
----- Message from Nav Ruiz MD sent at 9/29/2023  6:36 AM EDT -----  Hi can we please call patient and let her know biopsy results are unremarkable. Thank you.

## 2024-02-16 ENCOUNTER — TELEPHONE (OUTPATIENT)
Dept: SLEEP CENTER | Facility: CLINIC | Age: 39
End: 2024-02-16

## 2024-02-16 NOTE — TELEPHONE ENCOUNTER
----- Message from Donald Del Castillo MD sent at 2/16/2024  1:22 PM EST -----  approved  ----- Message -----  From: Brandi Gardiner  Sent: 2/14/2024   2:33 PM EST  To: Donald Del Castillo MD    Paper H&P  This Diagnostic sleep study needs approval.     If approved please sign and return to clerical pool.    If denied please include reasons why.  Also provide alternative testing if warranted. Please sign and return to clerical pool.

## 2024-07-02 ENCOUNTER — HOSPITAL ENCOUNTER (OUTPATIENT)
Dept: SLEEP CENTER | Facility: CLINIC | Age: 39
Discharge: HOME | End: 2024-07-02
Payer: COMMERCIAL

## 2024-07-02 DIAGNOSIS — R06.83 SNORING: ICD-10-CM

## 2024-07-02 PROCEDURE — 95810 POLYSOM 6/> YRS 4/> PARAM: CPT

## 2024-07-02 PROCEDURE — 95810 POLYSOM 6/> YRS 4/> PARAM: CPT | Performed by: INTERNAL MEDICINE

## 2024-07-03 PROBLEM — G47.33 OSA (OBSTRUCTIVE SLEEP APNEA): Status: ACTIVE | Noted: 2024-07-03

## 2024-07-03 NOTE — PROGRESS NOTES
Sleep Study Documentation    Pre-Sleep Study       Sleep testing procedure explained to patient:YES    Patient napped prior to study:NO    Caffeine:Dayshift worker after 12PM.  Caffeine use:YES- soda  12 ounces    Alcohol:Dayshift workers after 5PM: Alcohol use:NO    Typical day for patient:YES       Study Documentation    Sleep Study Indications: Snore    Sleep Study: Diagnostic   Snore:Severe  Supplemental O2: no    O2 flow rate (L/min) range   O2 flow rate (L/min) final   Minimum SaO2 85%  Baseline SaO2 96%    EKG abnormalities: no     EEG abnormalities: no    Were abnormal behaviors in sleep observed:NO    Is Total Sleep Study Recording Time < 2 hours: N/A    Is Total Sleep Study Recording Time > 2 hours but study is incomplete: N/A    Is Total Sleep Study Recording Time 6 hours or more but sleep was not obtained: NO    Patient classification:        Post-Sleep Study    Medication used at bedtime or during sleep study:YES other prescription medications    Patient reports time it took to fall asleep:greater than 60 minutes    Patient reports waking up during study:3 or more times.  Patient reports returning to sleep without difficulty.    Patient reports sleeping less than 2 hours without dreaming.    Does the Patient feel this is a typical night of sleep:worse than usual    Patient rated sleepiness: Very sleepy or tired    PAP treatment:no.

## 2024-07-24 ENCOUNTER — TELEPHONE (OUTPATIENT)
Dept: SLEEP CENTER | Facility: CLINIC | Age: 39
End: 2024-07-24

## 2024-07-24 NOTE — TELEPHONE ENCOUNTER
Sleep study resulted and shows moderate sleep apnea.     Per study order, PCP Dr. Vargas requests follow up with sleep specialist.    Needs to schedule consult.    Left message for patient to call office to review sleep study results.  Unable to send [employer] message as patient does not have an account.

## 2025-02-13 ENCOUNTER — APPOINTMENT (OUTPATIENT)
Age: 40
End: 2025-02-13
Payer: COMMERCIAL

## 2025-02-13 DIAGNOSIS — F84.0 AUTISTIC DISORDER, RESIDUAL STATE: ICD-10-CM

## 2025-02-13 DIAGNOSIS — F90.9 ATTENTION DEFICIT HYPERACTIVITY DISORDER (ADHD), UNSPECIFIED ADHD TYPE: ICD-10-CM

## 2025-02-13 DIAGNOSIS — Z79.899 NEED FOR PROPHYLACTIC CHEMOTHERAPY: ICD-10-CM

## 2025-02-13 DIAGNOSIS — F41.9 ANXIETY DISORDER OF CHILDHOOD OR ADOLESCENCE: ICD-10-CM

## 2025-02-13 LAB
25(OH)D3 SERPL-MCNC: 35.9 NG/ML (ref 30–100)
ALBUMIN SERPL BCG-MCNC: 5.1 G/DL (ref 3.5–5)
ALP SERPL-CCNC: 54 U/L (ref 34–104)
ALT SERPL W P-5'-P-CCNC: 37 U/L (ref 7–52)
ANION GAP SERPL CALCULATED.3IONS-SCNC: 12 MMOL/L (ref 4–13)
AST SERPL W P-5'-P-CCNC: 20 U/L (ref 13–39)
BASOPHILS # BLD AUTO: 0.04 THOUSANDS/ÂΜL (ref 0–0.1)
BASOPHILS NFR BLD AUTO: 1 % (ref 0–1)
BILIRUB SERPL-MCNC: 1.08 MG/DL (ref 0.2–1)
BUN SERPL-MCNC: 18 MG/DL (ref 5–25)
CALCIUM SERPL-MCNC: 10.3 MG/DL (ref 8.4–10.2)
CHLORIDE SERPL-SCNC: 101 MMOL/L (ref 96–108)
CHOLEST SERPL-MCNC: 165 MG/DL (ref ?–200)
CO2 SERPL-SCNC: 25 MMOL/L (ref 21–32)
CREAT SERPL-MCNC: 0.83 MG/DL (ref 0.6–1.3)
EOSINOPHIL # BLD AUTO: 0.06 THOUSAND/ÂΜL (ref 0–0.61)
EOSINOPHIL NFR BLD AUTO: 1 % (ref 0–6)
ERYTHROCYTE [DISTWIDTH] IN BLOOD BY AUTOMATED COUNT: 13 % (ref 11.6–15.1)
FOLATE SERPL-MCNC: 17.8 NG/ML
GFR SERPL CREATININE-BSD FRML MDRD: 111 ML/MIN/1.73SQ M
GLUCOSE P FAST SERPL-MCNC: 92 MG/DL (ref 65–99)
HCT VFR BLD AUTO: 49.6 % (ref 36.5–49.3)
HDLC SERPL-MCNC: 38 MG/DL
HGB BLD-MCNC: 17.8 G/DL (ref 12–17)
IMM GRANULOCYTES # BLD AUTO: 0.01 THOUSAND/UL (ref 0–0.2)
IMM GRANULOCYTES NFR BLD AUTO: 0 % (ref 0–2)
LDLC SERPL CALC-MCNC: 108 MG/DL (ref 0–100)
LIPASE SERPL-CCNC: 22 U/L (ref 11–82)
LYMPHOCYTES # BLD AUTO: 1.93 THOUSANDS/ÂΜL (ref 0.6–4.47)
LYMPHOCYTES NFR BLD AUTO: 27 % (ref 14–44)
MCH RBC QN AUTO: 30.7 PG (ref 26.8–34.3)
MCHC RBC AUTO-ENTMCNC: 35.9 G/DL (ref 31.4–37.4)
MCV RBC AUTO: 86 FL (ref 82–98)
MONOCYTES # BLD AUTO: 0.6 THOUSAND/ÂΜL (ref 0.17–1.22)
MONOCYTES NFR BLD AUTO: 8 % (ref 4–12)
NEUTROPHILS # BLD AUTO: 4.62 THOUSANDS/ÂΜL (ref 1.85–7.62)
NEUTS SEG NFR BLD AUTO: 63 % (ref 43–75)
NONHDLC SERPL-MCNC: 127 MG/DL
NRBC BLD AUTO-RTO: 0 /100 WBCS
PLATELET # BLD AUTO: 222 THOUSANDS/UL (ref 149–390)
PMV BLD AUTO: 10.2 FL (ref 8.9–12.7)
POTASSIUM SERPL-SCNC: 4.1 MMOL/L (ref 3.5–5.3)
PROLACTIN SERPL-MCNC: 4.82 NG/ML (ref 2.64–13.13)
PROT SERPL-MCNC: 8.3 G/DL (ref 6.4–8.4)
RBC # BLD AUTO: 5.8 MILLION/UL (ref 3.88–5.62)
SODIUM SERPL-SCNC: 138 MMOL/L (ref 135–147)
TRIGL SERPL-MCNC: 96 MG/DL (ref ?–150)
TSH SERPL DL<=0.05 MIU/L-ACNC: 1.21 UIU/ML (ref 0.45–4.5)
VIT B12 SERPL-MCNC: 463 PG/ML (ref 180–914)
WBC # BLD AUTO: 7.26 THOUSAND/UL (ref 4.31–10.16)

## 2025-02-13 PROCEDURE — 84443 ASSAY THYROID STIM HORMONE: CPT

## 2025-02-13 PROCEDURE — 80053 COMPREHEN METABOLIC PANEL: CPT

## 2025-02-13 PROCEDURE — 84146 ASSAY OF PROLACTIN: CPT

## 2025-02-13 PROCEDURE — 82306 VITAMIN D 25 HYDROXY: CPT

## 2025-02-13 PROCEDURE — 82607 VITAMIN B-12: CPT

## 2025-02-13 PROCEDURE — 83690 ASSAY OF LIPASE: CPT

## 2025-02-13 PROCEDURE — 80061 LIPID PANEL: CPT

## 2025-02-13 PROCEDURE — 82746 ASSAY OF FOLIC ACID SERUM: CPT

## 2025-02-13 PROCEDURE — 36415 COLL VENOUS BLD VENIPUNCTURE: CPT

## 2025-02-13 PROCEDURE — 85025 COMPLETE CBC W/AUTO DIFF WBC: CPT

## 2025-02-14 ENCOUNTER — APPOINTMENT (OUTPATIENT)
Age: 40
End: 2025-02-14
Payer: COMMERCIAL

## 2025-02-14 PROCEDURE — 80307 DRUG TEST PRSMV CHEM ANLYZR: CPT

## 2025-02-19 LAB
6MAM UR QL SCN: NEGATIVE NG/ML
ACCEPTABLE CREAT UR QL: 164 MG/DL
AMPHET UR QL SCN: NEGATIVE NG/ML
BARBITURATES UR QL SCN: NEGATIVE NG/ML
BENZODIAZ UR QL SCN: NEGATIVE NG/ML
BUPRENORPHINE UR QL CFM: NEGATIVE NG/ML
CANNABINOIDS UR QL SCN: NEGATIVE NG/ML
CARISOPRODOL UR QL: NEGATIVE NG/ML
COCAINE+BZE UR QL SCN: NEGATIVE NG/ML
ETHYL GLUCURONIDE UR QL SCN: NEGATIVE NG/ML
FENTANYL UR QL SCN: NEGATIVE NG/ML
GABAPENTIN SERPLBLD QL SCN: NEGATIVE UG/ML
METHADONE UR QL SCN: NEGATIVE NG/ML
NITRITE UR QL STRIP: NEGATIVE UG/ML
OPIATES UR QL SCN: NEGATIVE NG/ML
OXYCODONE+OXYMORPHONE UR QL SCN: NEGATIVE NG/ML
PCP UR QL SCN: NEGATIVE NG/ML
PROPOXYPH UR QL SCN: NEGATIVE NG/ML
SPECIMEN PH ACCEPTABLE UR: 6.6 (ref 4.5–8.9)
TAPENTADOL UR QL SCN: NEGATIVE NG/ML
TRAMADOL UR QL SCN: NEGATIVE NG/ML